# Patient Record
Sex: FEMALE | Race: WHITE | Employment: UNEMPLOYED | URBAN - METROPOLITAN AREA
[De-identification: names, ages, dates, MRNs, and addresses within clinical notes are randomized per-mention and may not be internally consistent; named-entity substitution may affect disease eponyms.]

---

## 2018-03-23 ENCOUNTER — HOSPITAL ENCOUNTER (OUTPATIENT)
Dept: PHYSICAL THERAPY | Age: 58
End: 2018-03-23

## 2022-06-30 ENCOUNTER — OFFICE VISIT (OUTPATIENT)
Dept: NEUROLOGY | Age: 62
End: 2022-06-30
Payer: OTHER GOVERNMENT

## 2022-06-30 VITALS
SYSTOLIC BLOOD PRESSURE: 112 MMHG | RESPIRATION RATE: 20 BRPM | BODY MASS INDEX: 26.36 KG/M2 | HEIGHT: 63 IN | WEIGHT: 148.8 LBS | DIASTOLIC BLOOD PRESSURE: 70 MMHG | HEART RATE: 78 BPM | OXYGEN SATURATION: 99 %

## 2022-06-30 DIAGNOSIS — G43.709 CHRONIC MIGRAINE W/O AURA W/O STATUS MIGRAINOSUS, NOT INTRACTABLE: Primary | ICD-10-CM

## 2022-06-30 PROCEDURE — 99204 OFFICE O/P NEW MOD 45 MIN: CPT | Performed by: STUDENT IN AN ORGANIZED HEALTH CARE EDUCATION/TRAINING PROGRAM

## 2022-06-30 RX ORDER — BUPROPION HCL 300 MG
TABLET, EXTENDED RELEASE 24 HR ORAL
COMMUNITY
Start: 2022-04-25

## 2022-06-30 RX ORDER — CELECOXIB 100 MG/1
100 CAPSULE ORAL AS NEEDED
COMMUNITY

## 2022-06-30 RX ORDER — ONABOTULINUMTOXINA 100 [USP'U]/1
200 INJECTION, POWDER, LYOPHILIZED, FOR SOLUTION INTRADERMAL; INTRAMUSCULAR ONCE
Qty: 200 UNITS | Refills: 0
Start: 2022-06-30 | End: 2022-06-30

## 2022-06-30 RX ORDER — MULTIVIT WITH MINERALS/HERBS
1 TABLET ORAL DAILY
COMMUNITY

## 2022-06-30 RX ORDER — RIMEGEPANT SULFATE 75 MG/75MG
75 TABLET, ORALLY DISINTEGRATING ORAL
COMMUNITY
End: 2022-08-03 | Stop reason: SDUPTHER

## 2022-06-30 RX ORDER — MENTHOL
1000 GEL (GRAM) TOPICAL DAILY
COMMUNITY

## 2022-06-30 RX ORDER — LORATADINE 10 MG/1
10 TABLET ORAL
COMMUNITY

## 2022-06-30 RX ORDER — VALACYCLOVIR HYDROCHLORIDE 500 MG/1
TABLET, FILM COATED ORAL
COMMUNITY
Start: 2016-02-22

## 2022-06-30 RX ORDER — ONABOTULINUMTOXINA 100 [USP'U]/1
200 INJECTION, POWDER, LYOPHILIZED, FOR SOLUTION INTRADERMAL; INTRAMUSCULAR ONCE
Qty: 155 UNITS | Refills: 0 | Status: SHIPPED | OUTPATIENT
Start: 2022-06-30 | End: 2022-06-30

## 2022-06-30 RX ORDER — ZOLPIDEM TARTRATE 5 MG/1
5 TABLET ORAL
COMMUNITY
Start: 2014-09-09

## 2022-06-30 NOTE — LETTER
6/30/2022    Patient: Marvin Cedillo   YOB: 1960   Date of Visit: 6/30/2022     Yovany Baker MD  [address] [facility] [address]  Via Fax: [phone]     Rian Comer MD  [address] [address]  Via Fax: [phone]    Dear MD Rian Brooks MD,      Thank you for referring Ms. Marvin Cedillo to [facility] for evaluation. My notes for this consultation are attached. If you have questions, please do not hesitate to call me. I look forward to following your patient along with you.       Sincerely,    Austen Mahmood MD

## 2022-06-30 NOTE — PROGRESS NOTES
Codie Vega is a 65 y.o. female . presents for New Patient Columba Ji MD), Migraine, and Advice Only (consult for Botox)   . A 65years old female patient with medical history of depression and chronic migraine currently on Botox every 3 months this referred here to continue care for migraine and continuation of Botox injections. Patient used to follow at the [facility] but currently she has to establish care with outside providers as she is retired. She has been having headaches for about 10 years. Frequency has been increasing gradually and about 3 weeks ago she has to retire. No changes after detention. Headaches are over the vertex mainly and sometimes over the occipital area. She feels as if her head is in a vice. Headaches are not throbbing. Might get severe, 10/10 in severity. And might last for a couple of days. Not associated with nausea or vomiting. Do not have any photophobia or phonophobia currently. Movement does not seem to worsen her headaches. When having headaches, she applies an ice cup and takes Nurtec which helps. Previously has been on different triptans including Relpax, Imitrex, and Maxalt. Previous preventive medications include Aimovig, topiramate, Depakote, and nortriptyline; headache frequency was unchanged with dose. Over the past 2 years, she is on Botox every 3 months. Botox seems to help. Headache frequency was down from a daily headache to a couple of headaches per month. Her last Botox injection was April 29. She has a couple of headaches in May 2022; no headaches in June. This is unusual for her as despite her Botox injection she might sometimes wake up with headaches. Headache triggers might include weather changes particularly raining. No extremity weakness. No difficulty walking. No significant sensory changes. CT scan of the brain from June 2017 was unremarkable.       Review of Systems   Constitutional: Positive for chills and weight loss. Negative for fever. HENT: Negative for hearing loss and tinnitus. Eyes: Negative for blurred vision and double vision. Respiratory: Negative for cough and shortness of breath. Cardiovascular: Negative for chest pain and leg swelling. Gastrointestinal: Negative for nausea and vomiting. Genitourinary: Negative for dysuria, frequency and urgency. Musculoskeletal: Positive for joint pain and neck pain. Negative for back pain (Kness and right hand). Skin: Negative for itching and rash. Neurological: Positive for tingling (fingers sometimes), sensory change and headaches. Negative for dizziness, tremors, focal weakness and seizures. Endo/Heme/Allergies: Does not bruise/bleed easily. Psychiatric/Behavioral: Positive for depression. The patient is nervous/anxious (sometimes) and has insomnia. No past medical history on file. No past surgical history on file. No family history on file. Social History     Socioeconomic History    Marital status: SINGLE     Spouse name: Not on file    Number of children: Not on file    Years of education: Not on file    Highest education level: Not on file   Occupational History    Not on file   Tobacco Use    Smoking status: Never Smoker    Smokeless tobacco: Never Used   Substance and Sexual Activity    Alcohol use: Yes    Drug use: Never    Sexual activity: Not on file   Other Topics Concern    Not on file   Social History Narrative    Not on file     Social Determinants of Health     Financial Resource Strain:     Difficulty of Paying Living Expenses: Not on file   Food Insecurity:     Worried About Running Out of Food in the Last Year: Not on file    Shailesh of Food in the Last Year: Not on file   Transportation Needs:     Lack of Transportation (Medical): Not on file    Lack of Transportation (Non-Medical):  Not on file   Physical Activity:     Days of Exercise per Week: Not on file    Minutes of Exercise per Session: Not on file   Stress:     Feeling of Stress : Not on file   Social Connections:     Frequency of Communication with Friends and Family: Not on file    Frequency of Social Gatherings with Friends and Family: Not on file    Attends Worship Services: Not on file    Active Member of [address] [place] [address] or Organizations: Not on file    Attends Club or Organization Meetings: Not on file    Marital Status: Not on file   Intimate Partner Violence:     Fear of Current or Ex-Partner: Not on file    Emotionally Abused: Not on file    Physically Abused: Not on file    Sexually Abused: Not on file   Housing Stability:     Unable to Pay for Housing in the Last Year: Not on file    Number of Nardam[place]asmine in the Last Year: Not on file    Unstable Housing in the Last Year: Not on file        Not on File      Current Outpatient Medications   Medication Sig Dispense Refill    Wellbutrin  mg XL tablet       valACYclovir (VALTREX) 500 mg tablet valacyclovir 500 mg tablet      zolpidem (Ambien) 5 mg tablet 5 mg.  b complex vitamins tablet Take 1 Tablet by mouth daily.  vitamin e (E GEMS) 1,000 unit capsule Take 1,000 Units by mouth daily.  calcium carbonate (CALTRATE 600 PO) Take  by mouth.  loratadine (Claritin) 10 mg tablet Take 10 mg by mouth.  rimegepant (Nurtec ODT) 75 mg disintegrating tablet Take 75 mg by mouth once as needed for Migraine.  onabotulinumtoxinA (Botox) 100 unit injection 200 Units by IntraMUSCular route once for 1 dose. 155 Units for Migraine 155 Units 0    celecoxib (CELEBREX) 100 mg capsule Take 100 mg by mouth as needed. Physical Exam  Constitutional:       Appearance: Normal appearance. HENT:      Mouth/Throat:      Mouth: Mucous membranes are moist.      Pharynx: Oropharynx is clear. No oropharyngeal exudate. Eyes:      Extraocular Movements: Extraocular movements intact. Pupils: Pupils are equal, round, and reactive to light.    Pulmonary: Effort: Pulmonary effort is normal. No respiratory distress. Musculoskeletal:         General: Normal range of motion. Cervical back: Normal range of motion and neck supple. Right lower leg: No edema. Left lower leg: No edema. Neurological:      Mental Status: She is alert. Comments: Mental status: Awake, alert, oriented , follows simple and complex commands, no neglect, no extinction. Speech and languge: fluent, coherent,and comprehension intact  CN: VFF, EOMI, PERRLA, face sensation intact , no facial asymmetry noted, palate elevation symmetric bilat, SS+SCM 5/5 bilat, tongue midline  Motor: no pronator drift, tone normal throughout, strength 5/5 throughout  Sensory: intact to light touch and PP  throughout  Coordination: FNF accurate w/o dysmetria  DTR: 2+ throughout  Gait: normal.            No visits with results within 3 Month(s) from this visit. Latest known visit with results is:   No results found for any previous visit. ICD-10-CM ICD-9-CM    1. Chronic migraine w/o aura w/o status migrainosus, not intractable  G43.709 346.70 onabotulinumtoxinA (Botox) 100 unit injection      DISCONTINUED: onabotulinumtoxinA (Botox) 100 unit injection     A 65years old female patient here for establishing care for her chronic migraine. Has been having headaches for the past 10 years. Frequency was getting worse until she started the Botox about 2 years ago. Since that, has a couple of headaches per month. Headaches can be severe and might last for couple of days. Currently, takes Nurtec and it helps. Previous acute relievers tried include Relpax, Imitrex, Maxalt with no significant benefit. She has also been on different preventive medications including Topamax, Depakote, and nortriptyline. Will continue her Botox injection here every 3 months.   Will also continue with Nurtec for acute attacks; I have told her not to take it more than 2 days a week and not more than once in 24 hours. Discussed the need for regular sleep, regular exercise, regular eating pattern. We will see her again in 4 weeks time for her Botox.

## 2022-08-03 ENCOUNTER — OFFICE VISIT (OUTPATIENT)
Dept: NEUROLOGY | Age: 62
End: 2022-08-03
Payer: OTHER GOVERNMENT

## 2022-08-03 VITALS
RESPIRATION RATE: 20 BRPM | TEMPERATURE: 97.7 F | DIASTOLIC BLOOD PRESSURE: 62 MMHG | OXYGEN SATURATION: 98 % | WEIGHT: 148.6 LBS | BODY MASS INDEX: 26.33 KG/M2 | HEIGHT: 63 IN | SYSTOLIC BLOOD PRESSURE: 90 MMHG | HEART RATE: 88 BPM

## 2022-08-03 DIAGNOSIS — G43.709 CHRONIC MIGRAINE W/O AURA W/O STATUS MIGRAINOSUS, NOT INTRACTABLE: Primary | ICD-10-CM

## 2022-08-03 RX ORDER — ONABOTULINUMTOXINA 100 [USP'U]/1
200 INJECTION, POWDER, LYOPHILIZED, FOR SOLUTION INTRADERMAL; INTRAMUSCULAR ONCE
Qty: 200 UNITS | Refills: 0
Start: 2022-08-03 | End: 2022-08-03

## 2022-08-03 RX ORDER — RIMEGEPANT SULFATE 75 MG/75MG
75 TABLET, ORALLY DISINTEGRATING ORAL
Qty: 8 TABLET | Refills: 5 | Status: SHIPPED | OUTPATIENT
Start: 2022-08-03 | End: 2022-08-03

## 2022-08-03 NOTE — PROGRESS NOTES
Derek Cervantes is a 65 y.o. female . presents for No chief complaint on file. .    A 65years old female patient here for follow-up of migraine and Botox injection. This is her first Botox injection in this clinic. Her last Botox injection at [facility] was on April 29. She has noticed some worsening of her headache over the past 1 week. She had severe migraine-like headaches the past week. Are severe; no associated nausea or vomiting. Nurtec helps. No major reactions from her previous Botox injections; initially, she had an episode of slight eyelid drooping on one side but with subsequent injections did not have any major reactions and no allergies. From initial encounter:  A 65years old female patient with medical history of depression and chronic migraine currently on Botox every 3 months this referred here to continue care for migraine and continuation of Botox injections. Patient used to follow at the [facility] but currently she has to establish care with outside providers as she is retired. She has been having headaches for about 10 years. Frequency has been increasing gradually and about 3 weeks ago she has to retire. No changes after alf. Headaches are over the vertex mainly and sometimes over the occipital area. She feels as if her head is in a vice. Headaches are not throbbing. Might get severe, 10/10 in severity. And might last for a couple of days. Not associated with nausea or vomiting. Do not have any photophobia or phonophobia currently. Movement does not seem to worsen her headaches. When having headaches, she applies an ice cup and takes Nurtec which helps. Previously has been on different triptans including Relpax, Imitrex, and Maxalt. Previous preventive medications include Aimovig, topiramate, Depakote, and nortriptyline; headache frequency was unchanged with dose. Over the past 2 years, she is on Botox every 3 months.   Botox seems to help.  Headache frequency was down from a daily headache to a couple of headaches per month. Her last Botox injection was April 29. She has a couple of headaches in May 2022; no headaches in June. This is unusual for her as despite her Botox injection she might sometimes wake up with headaches. Headache triggers might include weather changes particularly raining. No extremity weakness. No difficulty walking. No significant sensory changes. CT scan of the brain from June 2017 was unremarkable. Review of Systems   Constitutional:  Negative for chills, fever and weight loss. HENT:  Negative for hearing loss and tinnitus. Eyes:  Negative for double vision. Respiratory:  Negative for cough and shortness of breath. Cardiovascular:  Negative for chest pain and leg swelling. Gastrointestinal:  Negative for heartburn, nausea and vomiting. Genitourinary:  Negative for dysuria, frequency and urgency. Musculoskeletal:  Negative for back pain and neck pain. Skin:  Negative for itching and rash. Neurological:  Positive for dizziness (soemtimes) and headaches. Negative for tingling, tremors, sensory change, focal weakness and seizures. Endo/Heme/Allergies:  Does not bruise/bleed easily. No past medical history on file. No past surgical history on file. No family history on file.      Social History     Socioeconomic History    Marital status:      Spouse name: Not on file    Number of children: Not on file    Years of education: Not on file    Highest education level: Not on file   Occupational History    Not on file   Tobacco Use    Smoking status: Never    Smokeless tobacco: Never   Substance and Sexual Activity    Alcohol use: Yes    Drug use: Never    Sexual activity: Not on file   Other Topics Concern    Not on file   Social History Narrative    Not on file     Social Determinants of Health     Financial Resource Strain: Not on file   Food Insecurity: Not on file Transportation Needs: Not on file   Physical Activity: Not on file   Stress: Not on file   Social Connections: Not on file   Intimate Partner Violence: Not on file   Housing Stability: Not on file        Allergies   Allergen Reactions    Ciprofloxacin Nausea and Vomiting and Other (comments)    Codeine Hives, Itching, Other (comments) and Rash    Ciprocinonide Nausea and Vomiting         Current Outpatient Medications   Medication Sig Dispense Refill    onabotulinumtoxinA (Botox) 100 unit injection 200 Units by IntraMUSCular route once for 1 dose. 200 Units 0    rimegepant (Nurtec ODT) 75 mg disintegrating tablet Take 1 Tablet by mouth once as needed for Migraine for up to 1 dose. 8 Tablet 5    Wellbutrin  mg XL tablet       valACYclovir (VALTREX) 500 mg tablet valacyclovir 500 mg tablet      zolpidem (Ambien) 5 mg tablet 5 mg.      b complex vitamins tablet Take 1 Tablet by mouth daily. vitamin e (E GEMS) 1,000 unit capsule Take 1,000 Units by mouth daily. calcium carbonate (CALTRATE 600 PO) Take  by mouth.      loratadine (Claritin) 10 mg tablet Take 10 mg by mouth. celecoxib (CELEBREX) 100 mg capsule Take 100 mg by mouth as needed. Physical Exam  Constitutional:       Appearance: Normal appearance. HENT:      Head: Normocephalic and atraumatic. Mouth/Throat:      Mouth: Mucous membranes are moist.      Pharynx: Oropharynx is clear. No oropharyngeal exudate. Eyes:      Extraocular Movements: Extraocular movements intact. Pupils: Pupils are equal, round, and reactive to light. Pulmonary:      Effort: Pulmonary effort is normal. No respiratory distress. Musculoskeletal:         General: Normal range of motion. Cervical back: Normal range of motion and neck supple. Right lower leg: No edema. Left lower leg: No edema. Neurological:      Mental Status: She is alert.       Comments: Mental status: Awake, alert, oriented , follows simple and complex commands, no neglect, no extinction. Speech and languge: fluent, coherent,   and comprehension intact  CN: VFF, EOMI, PERRLA, face sensation intact , no facial asymmetry noted, palate elevation symmetric bilat, SS+SCM 5/5 bilat, tongue midline  Motor: no pronator drift, tone normal throughout, strength 5/5 throughout  Sensory: intact to light touch and PP  throughout  Coordination: FNF accurate w/o dysmetria  DTR: 2+ throughout  Gait: Normal.              No visits with results within 3 Month(s) from this visit. Latest known visit with results is:   No results found for any previous visit. ICD-10-CM ICD-9-CM    1. Chronic migraine w/o aura w/o status migrainosus, not intractable  G43.709 346.70 onabotulinumtoxinA (Botox) 100 unit injection      MT INJECTION,ONABOTULINUMTOXINA      CHEMODERVATE FACIAL/TRIGEM/CERV MUSC MIGRAINE      rimegepant (Nurtec ODT) 75 mg disintegrating tablet            A 65years old female patient here for follow-up of chronic migraine and Botox injection. Has been on Botox for the past 2 years with significant improvement in her migraine frequency. Rimegepant helps for acute attacks. We will proceed with Botox today. Discussed nonmedical options. We will see her again in 3 months time. Botox Procedure     Indications: Chronic migraine       Procedure: Botulinum toxin A 155 units injected in to the face, head, and neck muscles. The medication was injected as follows: Discussed the procedure with the patient including side effects. Informed consent was obtained. Patient and procedure confirmed. 155 units of Botox was injected at 31 sites, 5 units at each site(corrugators, procerus, frontalis, temporalis, occipitalis, cervical paraspinal muscles, and trapezius). Frontalis. ............................. 20 units divided in to 4 sites  . ......................... Rawleigh Billing 10 units divided in to 2 sites  Procerus. .............................    5 units in one site  Temporalis. .......................... 40 units divided in to 8 sites  Occipitalis. ........................... 30 units divided in to 6 sites  Cervical paraspinals. ........... 20 units divided in to 4 sites  Trapezius. ............................. 30 units divided in to 6 sites             Chart reviewed for the following:               IAdy MD, have reviewed the History, Physical and updated the Allergic reactions for [place] performed immediately prior to start of procedure:               Cinthia Dobson MD, have performed the following reviews on Sohail Second     prior to the start of the procedure:     * Patient was identified by name and date of birth   * Agreement on procedure being performed was verified  * Risks and Benefits explained to the patient  * Procedure site verified and marked as necessary  * Patient was positioned for comfort  * Consent was signed and verified     Time: 10:49 AM     Date of procedure: 08/03/22     Procedure performed by: Nathan Akins MD   Provider assisted by: None   Patient assisted by: self      How tolerated by patient: tolerated the procedure well with no complications         Lot Number [id number]  Expires 1/2025   Manufacture:  [id number]  NDC: 9021-2260-90  Dosage: 155 units     Wasted-45 units

## 2022-11-09 ENCOUNTER — OFFICE VISIT (OUTPATIENT)
Dept: NEUROLOGY | Age: 62
End: 2022-11-09
Payer: OTHER GOVERNMENT

## 2022-11-09 VITALS
HEART RATE: 87 BPM | SYSTOLIC BLOOD PRESSURE: 116 MMHG | RESPIRATION RATE: 18 BRPM | DIASTOLIC BLOOD PRESSURE: 68 MMHG | BODY MASS INDEX: 26.72 KG/M2 | OXYGEN SATURATION: 97 % | WEIGHT: 150.8 LBS | HEIGHT: 63 IN

## 2022-11-09 DIAGNOSIS — G43.709 CHRONIC MIGRAINE W/O AURA W/O STATUS MIGRAINOSUS, NOT INTRACTABLE: ICD-10-CM

## 2022-11-09 DIAGNOSIS — R51.9 MORNING HEADACHE: ICD-10-CM

## 2022-11-09 DIAGNOSIS — G43.709 CHRONIC MIGRAINE W/O AURA W/O STATUS MIGRAINOSUS, NOT INTRACTABLE: Primary | ICD-10-CM

## 2022-11-09 RX ORDER — ONABOTULINUMTOXINA 100 [USP'U]/1
155 INJECTION, POWDER, LYOPHILIZED, FOR SOLUTION INTRADERMAL; INTRAMUSCULAR ONCE
Qty: 200 UNITS | Refills: 0 | Status: SHIPPED | OUTPATIENT
Start: 2022-11-09 | End: 2022-11-09

## 2022-11-09 RX ORDER — ONABOTULINUMTOXINA 100 [USP'U]/1
200 INJECTION, POWDER, LYOPHILIZED, FOR SOLUTION INTRADERMAL; INTRAMUSCULAR ONCE
Qty: 200 UNITS | Refills: 0
Start: 2022-11-09 | End: 2022-11-09

## 2022-11-09 NOTE — PROGRESS NOTES
Miryam Ramey is a 65 y.o. female . presents for Procedure (Botox)     A 65years old female patient here for follow-up of migraine and Botox injection. Her last Botox injection was in Aug, 2022. Has noticed worsening headache over the past 1.5 months. Claims, Nurtec doesn't seem to work. She feels heaviness in her head; more when waking up in the morning. No nausea or vomiting. Has occasional photophobia; has phonophobia. But, her migraine like headaches are less frequent: 1-2 per week. Has no change in her vision. Right eye drooping. No diplopia. No facial weakness. From initial encounter:  A 65years old female patient with medical history of depression and chronic migraine currently on Botox every 3 months this referred here to continue care for migraine and continuation of Botox injections. Patient used to follow at the [facility] but currently she has to establish care with outside providers as she is retired. She has been having headaches for about 10 years. Frequency has been increasing gradually and about 3 weeks ago she has to retire. No changes after correction. Headaches are over the vertex mainly and sometimes over the occipital area. She feels as if her head is in a vice. Headaches are not throbbing. Might get severe, 10/10 in severity. And might last for a couple of days. Not associated with nausea or vomiting. Do not have any photophobia or phonophobia currently. Movement does not seem to worsen her headaches. When having headaches, she applies an ice cup and takes Nurtec which helps. Previously has been on different triptans including Relpax, Imitrex, and Maxalt. Previous preventive medications include Aimovig, topiramate, Depakote, and nortriptyline; headache frequency was unchanged with dose. Over the past 2 years, she is on Botox every 3 months. Botox seems to help. Headache frequency was down from a daily headache to a couple of headaches per month.   Her last Botox injection was April 29. She has a couple of headaches in May 2022; no headaches in June. This is unusual for her as despite her Botox injection she might sometimes wake up with headaches. Headache triggers might include weather changes particularly raining. No extremity weakness. No difficulty walking. No significant sensory changes. CT scan of the brain from June 2017 was unremarkable. Review of Systems   Constitutional:  Positive for weight loss. Negative for chills and fever. HENT:  Negative for hearing loss and tinnitus. Eyes:  Negative for blurred vision and double vision. Right  eye mild ptosis     Respiratory:  Negative for cough and shortness of breath. Cardiovascular:  Negative for chest pain and leg swelling. Gastrointestinal:  Positive for nausea. Negative for heartburn and vomiting. Genitourinary:  Negative for dysuria, frequency and urgency. Musculoskeletal:  Negative for back pain and neck pain. Skin:  Negative for itching and rash. Neurological:  Positive for dizziness (soemtimes) and headaches. Negative for tingling, tremors, sensory change, speech change, focal weakness and seizures. No past medical history on file. No past surgical history on file. No family history on file.      Social History     Socioeconomic History    Marital status:      Spouse name: Not on file    Number of children: Not on file    Years of education: Not on file    Highest education level: Not on file   Occupational History    Not on file   Tobacco Use    Smoking status: Never    Smokeless tobacco: Never   Substance and Sexual Activity    Alcohol use: Yes    Drug use: Never    Sexual activity: Not on file   Other Topics Concern    Not on file   Social History Narrative    Not on file     Social Determinants of Health     Financial Resource Strain: Not on file   Food Insecurity: Not on file   Transportation Needs: Not on file   Physical Activity: Not on file Stress: Not on file   Social Connections: Not on file   Intimate Partner Violence: Not on file   Housing Stability: Not on file        Allergies   Allergen Reactions    Ciprofloxacin Nausea and Vomiting and Other (comments)    Codeine Hives, Itching, Other (comments) and Rash    Ciprocinonide Nausea and Vomiting         Current Outpatient Medications   Medication Sig Dispense Refill    onabotulinumtoxinA (Botox) 100 unit injection 155 Units by IntraMUSCular route once for 1 dose. 200 Units 0    ubrogepant (UBRELVY) 100 mg tablet Take 1 Tablet by mouth once as needed for Migraine for up to 1 dose. Take 100 mg as needed for migraine headache; not to take it more than 2 days a week. 10 Tablet 3    Wellbutrin  mg XL tablet       valACYclovir (VALTREX) 500 mg tablet valacyclovir 500 mg tablet      zolpidem (AMBIEN) 5 mg tablet 5 mg.      b complex vitamins tablet Take 1 Tablet by mouth daily. vitamin e (E GEMS) 1,000 unit capsule Take 1,000 Units by mouth daily. calcium carbonate (CALTRATE 600 PO) Take  by mouth.      loratadine (CLARITIN) 10 mg tablet Take 10 mg by mouth. celecoxib (CELEBREX) 100 mg capsule Take 100 mg by mouth as needed. Physical Exam  Constitutional:       Appearance: Normal appearance. HENT:      Head: Normocephalic and atraumatic. Mouth/Throat:      Mouth: Mucous membranes are moist.      Pharynx: Oropharynx is clear. No oropharyngeal exudate. Eyes:      Extraocular Movements: Extraocular movements intact. Pupils: Pupils are equal, round, and reactive to light. Pulmonary:      Effort: Pulmonary effort is normal. No respiratory distress. Musculoskeletal:         General: Normal range of motion. Cervical back: Normal range of motion and neck supple. Right lower leg: No edema. Left lower leg: No edema. Neurological:      Mental Status: She is alert.       Comments: Mental status: Awake, alert, oriented , follows simple and complex commands, no neglect, no extinction. Speech and languge: fluent, coherent,   and comprehension intact  CN: VFF, EOMI, PERRLA, face sensation intact , no facial asymmetry noted, palate elevation symmetric bilat, SS+SCM 5/5 bilat, tongue midline  Motor: no pronator drift, tone normal throughout, strength 5/5 throughout  Sensory: intact to light touch and PP  throughout  Coordination: FNF accurate w/o dysmetria  DTR: 2+ throughout  Gait: Normal.          No visits with results within 3 Month(s) from this visit. Latest known visit with results is:   No results found for any previous visit. ICD-10-CM ICD-9-CM    1. Chronic migraine w/o aura w/o status migrainosus, not intractable  G43.709 346.70 [place] INJECTION,ONABOTULINUMTOXINA      CHEMODERVATE FACIAL/TRIGEM/CERV MUSC MIGRAINE      onabotulinumtoxinA (Botox) 100 unit injection      CT HEAD WO CONT      ubrogepant (UBRELVY) 100 mg tablet      2. Morning headache  R51.9 784.0 CT HEAD WO CONT            A 65years old female patient here for follow-up of chronic migraine and Botox injection. After her last botox injection, she has noticed worsening headache; mostly over the past 1.5 months. She claims, the Nurtec is not working as it used to be in the past. Taking 2 per week. The majority of her headaches are possoble tension type. Since, her headaches ae worse in the morning, need to rule out CNS lesions. Will get CT scan of the head. Will change Nurtec to Ubrelvy. Will see her in 3 months time. Botox Procedure     Indications: Chronic migraine       Procedure: Botulinum toxin A 155 units injected in to the face, head, and neck muscles. The medication was injected as follows: Discussed the procedure with the patient including side effects. Informed consent was obtained. Patient and procedure confirmed.  155 units of Botox was injected at 31 sites, 5 units at each site(corrugators, procerus, frontalis, temporalis, occipitalis, cervical paraspinal muscles, and trapezius). Frontalis. ............................. 20 units divided in to 4 sites  . ......................... Trula Blew 10 units divided in to 2 sites  Procerus. ............................. 5 units in one site  Temporalis. .......................... 40 units divided in to 8 sites  Occipitalis. ........................... 30 units divided in to 6 sites  Cervical paraspinals. ........... 20 units divided in to 4 sites  Trapezius. ............................. 30 units divided in to 6 sites             Chart reviewed for the following:               I, Ady Bolden MD, have reviewed the History, Physical and updated the Allergic reactions for El Lopez performed immediately prior to start of procedure:               Dale Parker MD, have performed the following reviews on Ban Spears     prior to the start of the procedure:     * Patient was identified by name and date of birth   * Agreement on procedure being performed was verified  * Risks and Benefits explained to the patient  * Procedure site verified and marked as necessary  * Patient was positioned for comfort  * Consent was signed and verified     Time: 10:13 AM     Date of procedure: 11/09/22     Procedure performed by: Raisa Roberto MD   Provider assisted by: None   Patient assisted by: self      How tolerated by patient: tolerated the procedure well with no complications         Lot Number [id number]  Expires 4/2025   Manufacture:  [id number]  NDC: 0715-9256-98  Dosage: 155 units     Wasted-45 units

## 2022-11-14 ENCOUNTER — TELEPHONE (OUTPATIENT)
Dept: NEUROLOGY | Age: 62
End: 2022-11-14

## 2022-12-31 ENCOUNTER — HOSPITAL ENCOUNTER (OUTPATIENT)
Dept: CT IMAGING | Age: 62
Discharge: HOME | End: 2022-12-31
Attending: STUDENT IN AN ORGANIZED HEALTH CARE EDUCATION/TRAINING PROGRAM
Payer: OTHER GOVERNMENT

## 2022-12-31 PROCEDURE — 70450 CT HEAD/BRAIN W/O DYE: CPT

## 2023-01-04 NOTE — PROGRESS NOTES
Spoke with patient, made aware of provider's comments regarding CT Head. Reminded of scheduled follow-up visit.

## 2023-02-08 ENCOUNTER — OFFICE VISIT (OUTPATIENT)
Dept: NEUROLOGY | Age: 63
End: 2023-02-08
Payer: OTHER GOVERNMENT

## 2023-02-08 VITALS
TEMPERATURE: 98.3 F | SYSTOLIC BLOOD PRESSURE: 98 MMHG | RESPIRATION RATE: 18 BRPM | OXYGEN SATURATION: 98 % | WEIGHT: 145.4 LBS | BODY MASS INDEX: 25.76 KG/M2 | DIASTOLIC BLOOD PRESSURE: 72 MMHG | HEART RATE: 85 BPM | HEIGHT: 63 IN

## 2023-02-08 DIAGNOSIS — G43.709 CHRONIC MIGRAINE W/O AURA W/O STATUS MIGRAINOSUS, NOT INTRACTABLE: Primary | ICD-10-CM

## 2023-02-08 RX ORDER — UBROGEPANT 100 MG/1
TABLET ORAL
COMMUNITY
Start: 2023-02-02

## 2023-02-08 RX ORDER — ONABOTULINUMTOXINA 100 [USP'U]/1
155 INJECTION, POWDER, LYOPHILIZED, FOR SOLUTION INTRADERMAL; INTRAMUSCULAR ONCE
Qty: 200 UNITS | Refills: 0
Start: 2023-02-08 | End: 2023-02-08

## 2023-02-08 NOTE — PROGRESS NOTES
Ashlie Sanchez is a 65 y.o. female . presents for No chief complaint on file. A 65years old female patient here for follow-up of migraine and Botox injection. Her last Botox injection was in November, 2022. Headache has been much better until about December 29 when she woke up with headache. Since then, she had more frequent headaches. In January, she had severe headaches; had used all her Mono Capuchin. Mono Capuchin helps for acute attacks. Has nausea but no vomiting. Has no photophobia but might have phonophobia. With Ubrelvy, headache lasts for an hour. She thinks that the weather might be a factor; perfume can also trigger headaches. From initial encounter:  A 65years old female patient with medical history of depression and chronic migraine currently on Botox every 3 months this referred here to continue care for migraine and continuation of Botox injections. Patient used to follow at the [facility] but currently she has to establish care with outside providers as she is retired. She has been having headaches for about 10 years. Frequency has been increasing gradually and about 3 weeks ago she has to retire. No changes after intermediate. Headaches are over the vertex mainly and sometimes over the occipital area. She feels as if her head is in a vice. Headaches are not throbbing. Might get severe, 10/10 in severity. And might last for a couple of days. Not associated with nausea or vomiting. Do not have any photophobia or phonophobia currently. Movement does not seem to worsen her headaches. When having headaches, she applies an ice cup and takes Nurtec which helps. Previously has been on different triptans including Relpax, Imitrex, and Maxalt. Previous preventive medications include Aimovig, topiramate, Depakote, and nortriptyline; headache frequency was unchanged with dose. Over the past 2 years, she is on Botox every 3 months. Botox seems to help.   Headache frequency was down from a daily headache to a couple of headaches per month. Her last Botox injection was April 29. She has a couple of headaches in May 2022; no headaches in June. This is unusual for her as despite her Botox injection she might sometimes wake up with headaches. Headache triggers might include weather changes particularly raining. No extremity weakness. No difficulty walking. No significant sensory changes. CT scan of the brain from June 2017 was unremarkable. Review of Systems   Constitutional:  Positive for weight loss. Negative for chills and fever. HENT:  Negative for hearing loss and tinnitus. Eyes:  Negative for blurred vision and double vision. Right  eye mild ptosis     Respiratory:  Negative for cough and shortness of breath. Cardiovascular:  Negative for chest pain and leg swelling. Gastrointestinal:  Negative for heartburn, nausea and vomiting. Genitourinary:  Negative for dysuria, frequency and urgency. Musculoskeletal:  Positive for back pain (sometimes). Negative for neck pain. Skin:  Positive for itching and rash (neck posteriorly). Neurological:  Positive for headaches. Negative for dizziness, tingling, tremors, sensory change, speech change, focal weakness and seizures. No past medical history on file. No past surgical history on file. No family history on file.      Social History     Socioeconomic History    Marital status:      Spouse name: Not on file    Number of children: Not on file    Years of education: Not on file    Highest education level: Not on file   Occupational History    Not on file   Tobacco Use    Smoking status: Never    Smokeless tobacco: Never   Substance and Sexual Activity    Alcohol use: Yes    Drug use: Never    Sexual activity: Not on file   Other Topics Concern    Not on file   Social History Narrative    Not on file     Social Determinants of Health     Financial Resource Strain: Not on file   Food Insecurity: Not on file   Transportation Needs: Not on file   Physical Activity: Not on file   Stress: Not on file   Social Connections: Not on file   Intimate Partner Violence: Not on file   Housing Stability: Not on file        Allergies   Allergen Reactions    Ciprofloxacin Nausea and Vomiting and Other (comments)    Codeine Hives, Itching, Other (comments) and Rash    Moxifloxacin Other (comments)    Ciprocinonide Nausea and Vomiting         Current Outpatient Medications   Medication Sig Dispense Refill    onabotulinumtoxinA (Botox) 100 unit injection 155 Units by IntraMUSCular route once for 1 dose. 200 Units 0    OTHER Brain Surge generic Prevagen      Wellbutrin  mg XL tablet       valACYclovir (VALTREX) 500 mg tablet valacyclovir 500 mg tablet      zolpidem (AMBIEN) 5 mg tablet 5 mg.      b complex vitamins tablet Take 1 Tablet by mouth daily. vitamin e (E GEMS) 1,000 unit capsule Take 1,000 Units by mouth daily. calcium carbonate (CALTRATE 600 PO) Take  by mouth.      loratadine (CLARITIN) 10 mg tablet Take 10 mg by mouth. celecoxib (CELEBREX) 100 mg capsule Take 100 mg by mouth as needed. Ubrelvy 100 mg tablet            Physical Exam  Constitutional:       Appearance: Normal appearance. HENT:      Head: Normocephalic and atraumatic. Mouth/Throat:      Mouth: Mucous membranes are moist.      Pharynx: Oropharynx is clear. No oropharyngeal exudate. Eyes:      Extraocular Movements: Extraocular movements intact. Pupils: Pupils are equal, round, and reactive to light. Pulmonary:      Effort: No respiratory distress. Musculoskeletal:         General: Normal range of motion. Cervical back: Normal range of motion and neck supple. Right lower leg: No edema. Left lower leg: No edema. Neurological:      Mental Status: She is alert. Comments: Mental status: Awake, alert, oriented , follows simple and complex commands, no neglect, no extinction.   Speech and languge: fluent, coherent,   and comprehension intact  CN: VFF, EOMI, PERRLA, face sensation intact , no facial asymmetry noted, palate elevation symmetric bilat, SS+SCM 5/5 bilat, tongue midline  Motor: no pronator drift, tone normal throughout, strength 5/5 throughout  Sensory: intact to light touch and PP  throughout  Coordination: FNF accurate w/o dysmetria  DTR: 2+ throughout  Gait: Normal.          No visits with results within 3 Month(s) from this visit. Latest known visit with results is:   No results found for any previous visit. ICD-10-CM ICD-9-CM    1. Chronic migraine w/o aura w/o status migrainosus, not intractable  G43.709 346.70 onabotulinumtoxinA (Botox) 100 unit injection      [place] INJECTION,ONABOTULINUMTOXINA      CHEMODERVATE FACIAL/TRIGEM/CERV MUSC MIGRAINE        A 65years old female patient here for follow-up of chronic migraine and Botox injection. Has worsening headache since December 29. More headaches over the past couple of weeks. Last month, she had about 10 headaches which are severe. Blanquita Frost helps for acute attacks. It seems that headache does not get worse midway after her Botox injection. Previously had tried different preventatives. Had been on Aimovig; had constipation. Will consider adding another preventative [options will be either Emgality or Ajovy; Alleene Hammans can also be considered] if the headache frequency gets worse despite the Botox. We will proceed with Botox today. We will see her again in 3 months time. Botox Procedure     Indications: Chronic migraine       Procedure: Botulinum toxin A 155 units injected in to the face, head, and neck muscles. The medication was injected as follows: Discussed the procedure with the patient including side effects. Informed consent was obtained. Patient and procedure confirmed.  155 units of Botox was injected at 31 sites, 5 units at each site(corrugators, procerus, frontalis, temporalis, occipitalis, cervical paraspinal muscles, and trapezius). Frontalis. ............................. 20 units divided in to 4 sites  . ......................... Jestine Courts 10 units divided in to 2 sites  Procerus. ............................. 5 units in one site  Temporalis. .......................... 40 units divided in to 8 sites  Occipitalis. ........................... 30 units divided in to 6 sites  Cervical paraspinals. ........... 20 units divided in to 4 sites  Trapezius. ............................. 30 units divided in to 6 sites             Chart reviewed for the following:               I, Ady Hicks MD, have reviewed the History, Physical and updated the Allergic reactions for El Lopez performed immediately prior to start of procedure:               Lenny Trotter MD, have performed the following reviews on Juan Kuhn     prior to the start of the procedure:     * Patient was identified by name and date of birth   * Agreement on procedure being performed was verified  * Risks and Benefits explained to the patient  * Procedure site verified and marked as necessary  * Patient was positioned for comfort  * Consent was signed and verified     Time: 11:58 AM     Date of procedure: 02/08/23     Procedure performed by: Maryse Ross MD   Provider assisted by: None   Patient assisted by: self      How tolerated by patient: tolerated the procedure well with no complications         Lot Number [id number]  Expires 4/2025   Manufacture:  [id number]  NDC: 6080-4915-06  Dosage: 155 units     Wasted-45 units

## 2023-05-24 ENCOUNTER — PROCEDURE VISIT (OUTPATIENT)
Age: 63
End: 2023-05-24
Payer: OTHER GOVERNMENT

## 2023-05-24 VITALS
RESPIRATION RATE: 20 BRPM | HEART RATE: 79 BPM | HEIGHT: 63 IN | DIASTOLIC BLOOD PRESSURE: 80 MMHG | OXYGEN SATURATION: 98 % | TEMPERATURE: 98.1 F | WEIGHT: 149.8 LBS | SYSTOLIC BLOOD PRESSURE: 122 MMHG | BODY MASS INDEX: 26.54 KG/M2

## 2023-05-24 DIAGNOSIS — G43.709 CHRONIC MIGRAINE WITHOUT AURA, NOT INTRACTABLE, WITHOUT STATUS MIGRAINOSUS: Primary | ICD-10-CM

## 2023-05-24 RX ORDER — UBROGEPANT 100 MG/1
100 TABLET ORAL PRN
Qty: 16 TABLET | Refills: 3 | Status: SHIPPED | OUTPATIENT
Start: 2023-05-24

## 2023-05-24 RX ADMIN — ONABOTULINUMTOXINA 155 UNITS: 200 INJECTION, POWDER, LYOPHILIZED, FOR SOLUTION INTRADERMAL; INTRAMUSCULAR at 10:58

## 2023-08-30 ENCOUNTER — PROCEDURE VISIT (OUTPATIENT)
Age: 63
End: 2023-08-30
Payer: OTHER GOVERNMENT

## 2023-08-30 VITALS
BODY MASS INDEX: 27.04 KG/M2 | DIASTOLIC BLOOD PRESSURE: 62 MMHG | OXYGEN SATURATION: 96 % | HEART RATE: 90 BPM | SYSTOLIC BLOOD PRESSURE: 98 MMHG | RESPIRATION RATE: 20 BRPM | TEMPERATURE: 98.4 F | HEIGHT: 63 IN | WEIGHT: 152.6 LBS

## 2023-08-30 DIAGNOSIS — G43.709 CHRONIC MIGRAINE WITHOUT AURA, NOT INTRACTABLE, WITHOUT STATUS MIGRAINOSUS: Primary | ICD-10-CM

## 2023-08-30 RX ADMIN — ONABOTULINUMTOXINA 155 UNITS: 200 INJECTION, POWDER, LYOPHILIZED, FOR SOLUTION INTRADERMAL; INTRAMUSCULAR at 10:29

## 2023-08-30 NOTE — PROGRESS NOTES
A 59years old female patient here for follow-up of migraine and Botox injection. Her last Botox injection was in May 2023. Over the past 2 weeks, headache has gotten worse. Has increased the use of Ubrelvy. She claims weather change has contributed for worsening headache. In addition, Botox wears off before her next injection. Has nausea but no vomiting. No photophobia; might have phonophobia. Savanna Barroso helps for acute attacks. Also applies ice pack and claims it helps. She will have right eye lid  lift surgery. From initial encounter:   A 65years old female patient with medical history of depression and chronic migraine currently on Botox every 3 months this referred here to continue care for migraine and continuation of Botox injections. Patient used to follow at the [facility] but currently she has to establish care with outside providers as she is retired. She has been having headaches for about 10 years. Frequency has been increasing gradually and about 3 weeks ago she has to retire. No changes after residential. Headaches are over the vertex mainly and sometimes over the occipital area. She feels as if her head is in a vice. Headaches are not throbbing. Might get severe, 10/10 in severity. And might last for a couple of days. Not associated with nausea  or vomiting. Do not have any photophobia or phonophobia currently. Movement does not seem to worsen her headaches. When having headaches, she applies an ice cup and takes Nurtec which helps. Previously has been on different triptans including Relpax,  Imitrex, and Maxalt. Previous preventive medications include Aimovig, topiramate, Depakote, and nortriptyline; headache frequency was unchanged with dose. Over the past 2 years, she is on Botox every 3 months. Botox seems to help. Headache frequency  was down from a daily headache to a couple of headaches per month. Her last Botox injection was April 29.   She has a

## 2023-11-29 ENCOUNTER — PROCEDURE VISIT (OUTPATIENT)
Age: 63
End: 2023-11-29
Payer: OTHER GOVERNMENT

## 2023-11-29 VITALS
OXYGEN SATURATION: 96 % | DIASTOLIC BLOOD PRESSURE: 66 MMHG | HEIGHT: 63 IN | BODY MASS INDEX: 27.57 KG/M2 | HEART RATE: 89 BPM | TEMPERATURE: 98.9 F | SYSTOLIC BLOOD PRESSURE: 114 MMHG | WEIGHT: 155.6 LBS | RESPIRATION RATE: 20 BRPM

## 2023-11-29 DIAGNOSIS — G43.709 CHRONIC MIGRAINE WITHOUT AURA, NOT INTRACTABLE, WITHOUT STATUS MIGRAINOSUS: Primary | ICD-10-CM

## 2023-11-29 PROCEDURE — 99214 OFFICE O/P EST MOD 30 MIN: CPT

## 2023-11-29 RX ORDER — UBROGEPANT 100 MG/1
100 TABLET ORAL PRN
Qty: 16 TABLET | Refills: 4 | Status: SHIPPED | OUTPATIENT
Start: 2023-11-29

## 2023-11-29 RX ADMIN — ONABOTULINUMTOXINA 155 UNITS: 200 INJECTION, POWDER, LYOPHILIZED, FOR SOLUTION INTRADERMAL; INTRAMUSCULAR at 12:14

## 2023-11-29 NOTE — PROGRESS NOTES
Christine Tobin MD, have reviewed the History, Physical and updated the Allergic reactions for [facility] performed immediately prior to start of procedure:                Kim Guzmán MD, have performed the following reviews on Gail Chapman      prior to the start of the procedure:       * Patient was identified by name and date of birth    * Agreement on procedure being performed was verified   * Risks and Benefits explained to the patient   * Procedure site verified and marked as necessary   * Patient was positioned for comfort   * Consent was signed and verified       Time: 11:15 AM       Date of procedure: 11/29/23       Procedure performed by: Valorie Shaw MD    Provider assisted by: None    Patient assisted by: self        How tolerated by patient: tolerated the procedure well with no complications            Lot Number [id number]   Expires 4/2026    Manufacture:  [id number]: [id number]   Dosage: 155 units       Wasted-45 units

## 2024-03-06 ENCOUNTER — PROCEDURE VISIT (OUTPATIENT)
Age: 64
End: 2024-03-06
Payer: OTHER GOVERNMENT

## 2024-03-06 VITALS
HEART RATE: 99 BPM | BODY MASS INDEX: 28.24 KG/M2 | DIASTOLIC BLOOD PRESSURE: 70 MMHG | HEIGHT: 63 IN | OXYGEN SATURATION: 97 % | SYSTOLIC BLOOD PRESSURE: 98 MMHG | RESPIRATION RATE: 20 BRPM | WEIGHT: 159.4 LBS | TEMPERATURE: 98.5 F

## 2024-03-06 DIAGNOSIS — G43.709 CHRONIC MIGRAINE WITHOUT AURA, NOT INTRACTABLE, WITHOUT STATUS MIGRAINOSUS: Primary | ICD-10-CM

## 2024-03-06 PROCEDURE — 64615 CHEMODENERV MUSC MIGRAINE: CPT | Performed by: STUDENT IN AN ORGANIZED HEALTH CARE EDUCATION/TRAINING PROGRAM

## 2024-03-06 PROCEDURE — 99214 OFFICE O/P EST MOD 30 MIN: CPT | Performed by: STUDENT IN AN ORGANIZED HEALTH CARE EDUCATION/TRAINING PROGRAM

## 2024-03-06 PROCEDURE — PBSHW PBB SHADOW CHARGE: Performed by: STUDENT IN AN ORGANIZED HEALTH CARE EDUCATION/TRAINING PROGRAM

## 2024-03-06 PROCEDURE — 99214 OFFICE O/P EST MOD 30 MIN: CPT

## 2024-03-06 RX ADMIN — ONABOTULINUMTOXINA 155 UNITS: 200 INJECTION, POWDER, LYOPHILIZED, FOR SOLUTION INTRADERMAL; INTRAMUSCULAR at 13:03

## 2024-03-06 NOTE — PROGRESS NOTES
A 64 years old female patient here for follow-up of migraine and Botox injection. Her last Botox injection was in November 2023.  Tendency for headache to get worse before her next injection.  Has a headache today.  8/10 in severity; associated with nausea.  No photophobia.  Her left temple feels tender with headache.  Ubrelvy helps for acute attacks.     From initial encounter:   A 62 years old female patient with medical history of depression and chronic migraine currently on Botox every 3 months this referred here to continue care for migraine and continuation of Botox injections.  Patient used to follow at the [facility] but currently she has to establish care with outside providers as she is retired.  She has been having headaches for about 10 years.  Frequency has been increasing gradually and about 3 weeks ago she has to retire.  No changes after long-term.   Headaches are over the vertex mainly and sometimes over the occipital area.  She feels as if her head is in a vice.  Headaches are not throbbing.  Might get severe, 10/10 in severity.  And might last for a couple of days.  Not associated with nausea  or vomiting.  Do not have any photophobia or phonophobia currently.  Movement does not seem to worsen her headaches.  When having headaches, she applies an ice cup and takes Nurtec which helps.  Previously has been on different triptans including Relpax,  Imitrex, and Maxalt.  Previous preventive medications include Aimovig, topiramate, Depakote, and nortriptyline; headache frequency was unchanged with dose.  Over the past 2 years, she is on Botox every 3 months.  Botox seems to help.  Headache frequency  was down from a daily headache to a couple of headaches per month.  Her last Botox injection was April 29.  She has a couple of headaches in May 2022; no headaches in June.  This is unusual for her as despite her Botox injection she might sometimes wake  up with headaches.  Headache

## 2024-09-11 ENCOUNTER — OFFICE VISIT (OUTPATIENT)
Age: 64
End: 2024-09-11

## 2024-09-11 VITALS
HEIGHT: 63 IN | HEART RATE: 72 BPM | OXYGEN SATURATION: 96 % | SYSTOLIC BLOOD PRESSURE: 110 MMHG | BODY MASS INDEX: 28.21 KG/M2 | DIASTOLIC BLOOD PRESSURE: 62 MMHG | WEIGHT: 159.2 LBS | TEMPERATURE: 97.7 F

## 2024-09-11 DIAGNOSIS — G43.709 CHRONIC MIGRAINE WITHOUT AURA, NOT INTRACTABLE, WITHOUT STATUS MIGRAINOSUS: Primary | ICD-10-CM

## 2024-12-06 ENCOUNTER — TELEPHONE (OUTPATIENT)
Age: 64
End: 2024-12-06

## 2024-12-10 ENCOUNTER — TELEPHONE (OUTPATIENT)
Age: 64
End: 2024-12-10

## 2024-12-10 NOTE — TELEPHONE ENCOUNTER
Submitted last two sets of office notes as requested to Kate.  Asked they rush approval.  Botox (see scan)

## 2024-12-16 ENCOUNTER — TELEPHONE (OUTPATIENT)
Age: 64
End: 2024-12-16

## 2024-12-18 ENCOUNTER — OFFICE VISIT (OUTPATIENT)
Age: 64
End: 2024-12-18

## 2024-12-18 VITALS
SYSTOLIC BLOOD PRESSURE: 91 MMHG | BODY MASS INDEX: 28.24 KG/M2 | TEMPERATURE: 98 F | HEIGHT: 63 IN | DIASTOLIC BLOOD PRESSURE: 63 MMHG | HEART RATE: 77 BPM | WEIGHT: 159.4 LBS | OXYGEN SATURATION: 98 %

## 2024-12-18 DIAGNOSIS — G43.709 CHRONIC MIGRAINE WITHOUT AURA, NOT INTRACTABLE, WITHOUT STATUS MIGRAINOSUS: Primary | ICD-10-CM

## 2024-12-18 RX ORDER — CONJUGATED ESTROGENS 0.62 MG/G
CREAM VAGINAL
COMMUNITY
Start: 2024-09-10

## 2024-12-18 RX ORDER — B-COMPLEX WITH VITAMIN C
1 TABLET ORAL DAILY
COMMUNITY

## 2024-12-18 RX ORDER — MONTELUKAST SODIUM 10 MG/1
10 TABLET ORAL
COMMUNITY

## 2024-12-18 RX ORDER — UBROGEPANT 100 MG/1
100 TABLET ORAL PRN
Qty: 16 TABLET | Refills: 4 | Status: SHIPPED | OUTPATIENT
Start: 2024-12-18

## 2024-12-18 NOTE — PROGRESS NOTES
A 64 years old female patient here for follow-up of migraine and Botox injection. Her last Botox injection was in September 2024.  She was tracking down her headaches.  She has 5 headaches in September, 6 headaches in October, 4 headaches in November; no severe headaches over the past couple of weeks.  Headaches get better with Ubrelvy.  Might have phonophobia; no photophobia.  Has nausea but no vomiting.  Had shooting pain over the occipital area on the left side; did not progress to global headache.  Infrequent occipital pain.     From initial encounter:   A 62 years old female patient with medical history of depression and chronic migraine currently on Botox every 3 months this referred here to continue care for migraine and continuation of Botox injections.  Patient used to follow at the [facility] but currently she has to establish care with outside providers as she is retired.  She has been having headaches for about 10 years.  Frequency has been increasing gradually and about 3 weeks ago she has to retire.  No changes after long-term.   Headaches are over the vertex mainly and sometimes over the occipital area.  She feels as if her head is in a vice.  Headaches are not throbbing.  Might get severe, 10/10 in severity.  And might last for a couple of days.  Not associated with nausea  or vomiting.  Do not have any photophobia or phonophobia currently.  Movement does not seem to worsen her headaches.  When having headaches, she applies an ice cup and takes Nurtec which helps.  Previously has been on different triptans including Relpax,  Imitrex, and Maxalt.  Previous preventive medications include Aimovig, topiramate, Depakote, and nortriptyline; headache frequency was unchanged with dose.  Over the past 2 years, she is on Botox every 3 months.  Botox seems to help.  Headache frequency  was down from a daily headache to a couple of headaches per month.  Her last Botox injection was April 29.

## 2025-03-19 ENCOUNTER — OFFICE VISIT (OUTPATIENT)
Age: 65
End: 2025-03-19

## 2025-03-19 VITALS
HEIGHT: 63 IN | OXYGEN SATURATION: 97 % | TEMPERATURE: 96.8 F | RESPIRATION RATE: 18 BRPM | SYSTOLIC BLOOD PRESSURE: 102 MMHG | DIASTOLIC BLOOD PRESSURE: 66 MMHG | BODY MASS INDEX: 28.1 KG/M2 | WEIGHT: 158.6 LBS | HEART RATE: 79 BPM

## 2025-03-19 DIAGNOSIS — G43.709 CHRONIC MIGRAINE WITHOUT AURA, NOT INTRACTABLE, WITHOUT STATUS MIGRAINOSUS: Primary | ICD-10-CM

## 2025-03-19 NOTE — PROGRESS NOTES
divided in to 4 sites   ...........................  10 units divided in to 2 sites   Procerus..............................   5 units in one site   Temporalis...........................   40 units divided in to 8 sites   Occipitalis............................    30 units divided in to 6 sites   Cervical paraspinals............   20 units divided in to 4 sites   Trapezius..............................   30 units divided in to 6 sites                  Chart reviewed for the following:                Arturo LEE MD, have reviewed the History, Physical and updated the Allergic reactions for Patsy  Yaakov       TIME OUT performed immediately prior to start of procedure:                Arturo LEE MD, have performed the following reviews on Patsy Yaakov      prior to the start of the procedure:       * Patient was identified by name and date of birth    * Agreement on procedure being performed was verified   * Risks and Benefits explained to the patient   * Procedure site verified and marked as necessary   * Patient was positioned for comfort   * Consent was signed and verified       Time: 10:00 AM       Date of procedure: 03/19/25        Procedure performed by: Arturo Singh MD    Provider assisted by: None    Patient assisted by: self        How tolerated by patient: tolerated the procedure well with no complications            Lot Number [id number]   Expires 12/2026    Manufacture: [employer]   NDC: 6506-8401-68   Dosage: 155 units

## 2025-06-18 ENCOUNTER — OFFICE VISIT (OUTPATIENT)
Age: 65
End: 2025-06-18

## 2025-06-18 VITALS
HEIGHT: 63 IN | OXYGEN SATURATION: 99 % | HEART RATE: 72 BPM | DIASTOLIC BLOOD PRESSURE: 64 MMHG | SYSTOLIC BLOOD PRESSURE: 103 MMHG | BODY MASS INDEX: 25.87 KG/M2 | WEIGHT: 146 LBS | TEMPERATURE: 98.2 F

## 2025-06-18 DIAGNOSIS — R55 SYNCOPE, UNSPECIFIED SYNCOPE TYPE: Primary | ICD-10-CM

## 2025-06-18 DIAGNOSIS — G43.709 CHRONIC MIGRAINE WITHOUT AURA, NOT INTRACTABLE, WITHOUT STATUS MIGRAINOSUS: ICD-10-CM

## 2025-06-18 RX ORDER — UBROGEPANT 100 MG/1
100 TABLET ORAL PRN
Qty: 16 TABLET | Refills: 4 | Status: SHIPPED | OUTPATIENT
Start: 2025-06-18

## 2025-06-18 NOTE — PROGRESS NOTES
A 65 years old female patient here for follow-up of migraine and Botox injection. Her last Botox injection was in March 2025.  Has not noticed worsening headache this month; so far, had 12 headache days.  In pain today.  She had used more Ubrelvy 6 months.  Mentioned that occasionally might work.  The storm and weather changes might worsen the headache.  Headaches are associated with nausea; no vomiting.  Has photophobia in the morning; phonophobia, nausea.  On June 12, 2025, she mentioned a passing out episode.  She was in a store, felt dizzy when getting up from squatting; that she passed out [fell backwards].  Bystanders helped her.  After she stood up, she fell forward again.  Total duration unknown.  No associated abnormal improvement, tongue bite, or incontinence.  No past history of syncope.  No acute illness.  Was not in pain.  She mentions that she had eaten her lunch.  When bending over, sometimes feels dizzy..  She is not on blood pressure medications.     From initial encounter:   A 62 years old female patient with medical history of depression and chronic migraine currently on Botox every 3 months this referred here to continue care for migraine and continuation of Botox injections.  Patient used to follow at the [facility] but currently she has to establish care with outside providers as she is retired.  She has been having headaches for about 10 years.  Frequency has been increasing gradually and about 3 weeks ago she has to retire.  No changes after detention.   Headaches are over the vertex mainly and sometimes over the occipital area.  She feels as if her head is in a vice.  Headaches are not throbbing.  Might get severe, 10/10 in severity.  And might last for a couple of days.  Not associated with nausea  or vomiting.  Do not have any photophobia or phonophobia currently.  Movement does not seem to worsen her headaches.  When having headaches, she applies an ice cup and takes